# Patient Record
Sex: OTHER/UNKNOWN | URBAN - NONMETROPOLITAN AREA
[De-identification: names, ages, dates, MRNs, and addresses within clinical notes are randomized per-mention and may not be internally consistent; named-entity substitution may affect disease eponyms.]

---

## 2021-12-14 ENCOUNTER — APPOINTMENT (RX ONLY)
Dept: URBAN - NONMETROPOLITAN AREA CLINIC 13 | Facility: CLINIC | Age: 36
Setting detail: DERMATOLOGY
End: 2021-12-14

## 2021-12-14 DIAGNOSIS — Z41.9 ENCOUNTER FOR PROCEDURE FOR PURPOSES OTHER THAN REMEDYING HEALTH STATE, UNSPECIFIED: ICD-10-CM

## 2021-12-14 PROCEDURE — ? BOTOX

## 2021-12-14 NOTE — PROCEDURE: BOTOX
Levator Labii Superioris Units: 0
Show Orbicularis Oculi Units: Yes
Price (Use Numbers Only, No Special Characters Or $): 473
Additional Area 4 Location: corners of nose for gummy smile
Lot #: ,
Additional Area 6 Location: chin
Dilution (U/0.1 Cc): 1
Detail Level: Zone
Additional Area 3 Location: left lateral eye 3 Right 4
Show Right And Left Periorbital Units: No
Additional Area 1 Location: brow,
Glabellar Complex Units: 30
Consent: Written consent obtained. Risks include but not limited to lid/brow ptosis, bruising, swelling, diplopia, temporary effect, incomplete chemical denervation.
Expiration Date (Month Year): 4/24,
Additional Area 1 Units: 6
Post-Care Instructions: Patient instructed to not lie down for 4 hours and limit physical activity for 24 hours. Patient instructed not to travel by airplane for 48 hours.
Forehead Units: 7
Additional Area 5 Location: forhead R side 1 unit.
Additional Area 2 Location: upper lip

## 2025-05-06 ENCOUNTER — RX ONLY (RX ONLY)
Age: 40
End: 2025-05-06

## 2025-05-06 RX ORDER — HYDROQUINONE 4 %
PEA-SIZED AMOUNT CREAM (GRAM) TOPICAL
Qty: 1 | Refills: 2 | Status: ERX | COMMUNITY
Start: 2025-05-06

## 2025-05-07 ENCOUNTER — APPOINTMENT (OUTPATIENT)
Dept: URBAN - NONMETROPOLITAN AREA CLINIC 13 | Facility: CLINIC | Age: 40
Setting detail: DERMATOLOGY
End: 2025-05-07

## 2025-05-07 DIAGNOSIS — Z41.9 ENCOUNTER FOR PROCEDURE FOR PURPOSES OTHER THAN REMEDYING HEALTH STATE, UNSPECIFIED: ICD-10-CM

## 2025-05-07 PROCEDURE — ? BOTOX

## 2025-05-07 PROCEDURE — ? ADDITIONAL NOTES

## 2025-05-07 PROCEDURE — ? COSMETIC CONSULTATION: BOTULINUM TOXIN

## 2025-05-07 NOTE — PROCEDURE: ADDITIONAL NOTES
Render Risk Assessment In Note?: no
Additional Notes: Daja Patino NP was consulted and she agrees with treatment plan as per written protocol.
Detail Level: Simple

## 2025-05-07 NOTE — PROCEDURE: BOTOX
Lcl Root Units: 0
Forehead Units: 8
Post-Care Instructions: Patient instructed to not lie down for 4 hours and limit physical activity for 24 hours. Patient instructed not to travel by airplane for 48 hours.
Additional Area 1 Units: 5
Dilution (U/0.1 Cc): 1
Additional Area 5 Units: 3
Show Additional Area 2: Yes
Additional Area 4 Location: under lower lash line
Show Lcl Units: No
Additional Area 3 Location: [facility]
Expiration Date (Month Year): 6/26
Additional Area 6 Location: Lateral brow
Additional Area 2 Location: lip upper
Additional Area 2 Units: 4
Inferior Lateral Orbicularis Oculi Units: 20
Incrementing Botox Units: By 0.5 Units
Lot #: [id number]
Additional Area 5 Location: chin
Glabellar Complex Units: 25
Price (Use Numbers Only, No Special Characters Or $): 770
Detail Level: Zone
Consent: Written consent obtained. Risks include but not limited to lid/brow ptosis, bruising, swelling, diplopia, temporary effect, incomplete chemical denervation.
Additional Area 1 Location: lateral  brow